# Patient Record
(demographics unavailable — no encounter records)

---

## 2025-05-16 NOTE — HISTORY OF PRESENT ILLNESS
[FreeTextEntry1] : I am here for my annual visit. [de-identified] : The patient is a 74 YO female with a pmhx of HTN, HLD, pre-DM, GERD who presents to the clinic today for her annual visit. She reports feeling well. She was recently in a cruise. She reports having lower extremity swelling after eating meals high in sodium. However, this went away on its own. She has no other concerns today.

## 2025-05-16 NOTE — HEALTH RISK ASSESSMENT
[MammogramComments] : to be scheduled for repeat [Good] : ~his/her~  mood as  good [Monthly or less (1 pt)] : Monthly or less (1 point) [No falls in past year] : Patient reported no falls in the past year [0] : 2) Feeling down, depressed, or hopeless: Not at all (0) [PHQ-2 Negative - No further assessment needed] : PHQ-2 Negative - No further assessment needed [Benzodiazepines] : benzodiazepines [Opioids] : opioids [Blood Thinners] : blood thinners [Muscle Relaxants] : muscle relaxants [Sedatives] : sedatives [Former] : Former [> 15 Years] : > 15 Years [NO] : No [No Retinopathy] : No retinopathy [Patient reported mammogram was normal] : Patient reported mammogram was normal [Patient reported colonoscopy was abnormal] : Patient reported colonoscopy was abnormal [None] : None [Alone] : lives alone [Retired] : retired [] :  [Fully functional (bathing, dressing, toileting, transferring, walking, feeding)] : Fully functional (bathing, dressing, toileting, transferring, walking, feeding) [Fully functional (using the telephone, shopping, preparing meals, housekeeping, doing laundry, using] : Fully functional and needs no help or supervision to perform IADLs (using the telephone, shopping, preparing meals, housekeeping, doing laundry, using transportation, managing medications and managing finances) [With Patient/Caregiver] : , with patient/caregiver [Designated Healthcare Proxy] : Designated healthcare proxy [Name: ___] : Health Care Proxy's Name: [unfilled]  [Relationship: ___] : Relationship: [unfilled] [1 or 2 (0 pts)] : 1 or 2 (0 points) [Never (0 pts)] : Never (0 points) [Yes] : Reviewed medication list for presence of high-risk medications. [HIV test declined] : HIV test declined [Hepatitis C test declined] : Hepatitis C test declined [Smoke Detector] : smoke detector [Seat Belt] :  uses seat belt [Audit-CScore] : 1 [de-identified] : walks [de-identified] : little red meat, mainly fish and chicken. consumes vegetables as well. [EyeExamDate] : 01/24 [Change in mental status noted] : No change in mental status noted [Language] : denies difficulty with language [Behavior] : denies difficulty with behavior [Learning/Retaining New Information] : denies difficulty learning/retaining new information [Handling Complex Tasks] : denies difficulty handling complex tasks [Reasoning] : denies difficulty with reasoning [Spatial Ability and Orientation] : denies difficulty with spatial ability and orientation [Sexually Active] : not sexually active [High Risk Behavior] : no high risk behavior [Reports changes in hearing] : Reports no changes in hearing [Reports changes in vision] : Reports no changes in vision [Reports normal functional visual acuity (ie: able to read med bottle)] : Reports poor functional visual acuity.  [Reports changes in dental health] : Reports no changes in dental health [MammogramDate] : 04/24 [ColonoscopyDate] : 11/24 [ColonoscopyComments] : sigmoid poly, repeat in 5 years [AdvancecareDate] : 05/25

## 2025-05-16 NOTE — HEALTH RISK ASSESSMENT
[MammogramComments] : to be scheduled for repeat [Good] : ~his/her~  mood as  good [Monthly or less (1 pt)] : Monthly or less (1 point) [No falls in past year] : Patient reported no falls in the past year [0] : 2) Feeling down, depressed, or hopeless: Not at all (0) [PHQ-2 Negative - No further assessment needed] : PHQ-2 Negative - No further assessment needed [Benzodiazepines] : benzodiazepines [Opioids] : opioids [Blood Thinners] : blood thinners [Muscle Relaxants] : muscle relaxants [Sedatives] : sedatives [Former] : Former [> 15 Years] : > 15 Years [NO] : No [No Retinopathy] : No retinopathy [Patient reported mammogram was normal] : Patient reported mammogram was normal [Patient reported colonoscopy was abnormal] : Patient reported colonoscopy was abnormal [None] : None [Alone] : lives alone [Retired] : retired [] :  [Fully functional (bathing, dressing, toileting, transferring, walking, feeding)] : Fully functional (bathing, dressing, toileting, transferring, walking, feeding) [Fully functional (using the telephone, shopping, preparing meals, housekeeping, doing laundry, using] : Fully functional and needs no help or supervision to perform IADLs (using the telephone, shopping, preparing meals, housekeeping, doing laundry, using transportation, managing medications and managing finances) [With Patient/Caregiver] : , with patient/caregiver [Designated Healthcare Proxy] : Designated healthcare proxy [Name: ___] : Health Care Proxy's Name: [unfilled]  [Relationship: ___] : Relationship: [unfilled] [1 or 2 (0 pts)] : 1 or 2 (0 points) [Never (0 pts)] : Never (0 points) [Yes] : Reviewed medication list for presence of high-risk medications. [HIV test declined] : HIV test declined [Hepatitis C test declined] : Hepatitis C test declined [Smoke Detector] : smoke detector [Seat Belt] :  uses seat belt [Audit-CScore] : 1 [de-identified] : walks [de-identified] : little red meat, mainly fish and chicken. consumes vegetables as well. [EyeExamDate] : 01/24 [Change in mental status noted] : No change in mental status noted [Language] : denies difficulty with language [Behavior] : denies difficulty with behavior [Learning/Retaining New Information] : denies difficulty learning/retaining new information [Handling Complex Tasks] : denies difficulty handling complex tasks [Reasoning] : denies difficulty with reasoning [Spatial Ability and Orientation] : denies difficulty with spatial ability and orientation [Sexually Active] : not sexually active [High Risk Behavior] : no high risk behavior [Reports changes in hearing] : Reports no changes in hearing [Reports changes in vision] : Reports no changes in vision [Reports normal functional visual acuity (ie: able to read med bottle)] : Reports poor functional visual acuity.  [Reports changes in dental health] : Reports no changes in dental health [MammogramDate] : 04/24 [ColonoscopyDate] : 11/24 [ColonoscopyComments] : sigmoid poly, repeat in 5 years [AdvancecareDate] : 05/25

## 2025-05-16 NOTE — HISTORY OF PRESENT ILLNESS
[FreeTextEntry1] : I am here for my annual visit. [de-identified] : The patient is a 74 YO female with a pmhx of HTN, HLD, pre-DM, GERD who presents to the clinic today for her annual visit. She reports feeling well. She was recently in a cruise. She reports having lower extremity swelling after eating meals high in sodium. However, this went away on its own. She has no other concerns today.

## 2025-05-16 NOTE — ASSESSMENT
[Vaccines Reviewed] : Immunizations reviewed today. Please see immunization details in the vaccine log within the immunization flowsheet.  [FreeTextEntry1] : The patient is a 72 YO female with a pmhx of HTN, HLD, pre-DM, GERD who presents to the clinic today for her annual visit. She reports feeling well. She was recently in a cruise. She reports having lower extremity swelling after eating meals high in sodium. However, this went away on its own. She has no other concerns today.  Annual alcohol screen completed this visit using AUDIT C screening tool and results discussed with patient.  Alcohol use within healthy limits.  Healthy drinking guidelines shared with patient (Female and male overage 65 no more than 3 SSD on any day, no more than 7 per week). Positive reinforcement provided given patient currently within healthy guidelines.   I conducted an annual depression screen using the PHQ 2/9 and discussed results with the patient during this visit.  Screening not suggestive of diagnosis of MDD.    Due for breast Ca and osteoporosis screening - referrals/Rx's provided.   RTC in 6 months  Case Discussed with Dr. German

## 2025-05-16 NOTE — HISTORY OF PRESENT ILLNESS
[FreeTextEntry1] : I am here for my annual visit. [de-identified] : The patient is a 72 YO female with a pmhx of HTN, HLD, pre-DM, GERD who presents to the clinic today for her annual visit. She reports feeling well. She was recently in a cruise. She reports having lower extremity swelling after eating meals high in sodium. However, this went away on its own. She has no other concerns today.

## 2025-05-16 NOTE — ASSESSMENT
[Vaccines Reviewed] : Immunizations reviewed today. Please see immunization details in the vaccine log within the immunization flowsheet.  [FreeTextEntry1] : The patient is a 74 YO female with a pmhx of HTN, HLD, pre-DM, GERD who presents to the clinic today for her annual visit. She reports feeling well. She was recently in a cruise. She reports having lower extremity swelling after eating meals high in sodium. However, this went away on its own. She has no other concerns today.  Annual alcohol screen completed this visit using AUDIT C screening tool and results discussed with patient.  Alcohol use within healthy limits.  Healthy drinking guidelines shared with patient (Female and male overage 65 no more than 3 SSD on any day, no more than 7 per week). Positive reinforcement provided given patient currently within healthy guidelines.   I conducted an annual depression screen using the PHQ 2/9 and discussed results with the patient during this visit.  Screening not suggestive of diagnosis of MDD.    Due for breast Ca and osteoporosis screening - referrals/Rx's provided.   RTC in 6 months  Case Discussed with Dr. German

## 2025-05-16 NOTE — HEALTH RISK ASSESSMENT
[MammogramComments] : to be scheduled for repeat [Good] : ~his/her~  mood as  good [Monthly or less (1 pt)] : Monthly or less (1 point) [No falls in past year] : Patient reported no falls in the past year [0] : 2) Feeling down, depressed, or hopeless: Not at all (0) [PHQ-2 Negative - No further assessment needed] : PHQ-2 Negative - No further assessment needed [Benzodiazepines] : benzodiazepines [Opioids] : opioids [Blood Thinners] : blood thinners [Muscle Relaxants] : muscle relaxants [Sedatives] : sedatives [Former] : Former [> 15 Years] : > 15 Years [NO] : No [No Retinopathy] : No retinopathy [Patient reported mammogram was normal] : Patient reported mammogram was normal [Patient reported colonoscopy was abnormal] : Patient reported colonoscopy was abnormal [None] : None [Alone] : lives alone [Retired] : retired [] :  [Fully functional (bathing, dressing, toileting, transferring, walking, feeding)] : Fully functional (bathing, dressing, toileting, transferring, walking, feeding) [Fully functional (using the telephone, shopping, preparing meals, housekeeping, doing laundry, using] : Fully functional and needs no help or supervision to perform IADLs (using the telephone, shopping, preparing meals, housekeeping, doing laundry, using transportation, managing medications and managing finances) [With Patient/Caregiver] : , with patient/caregiver [Designated Healthcare Proxy] : Designated healthcare proxy [Name: ___] : Health Care Proxy's Name: [unfilled]  [Relationship: ___] : Relationship: [unfilled] [1 or 2 (0 pts)] : 1 or 2 (0 points) [Never (0 pts)] : Never (0 points) [Yes] : Reviewed medication list for presence of high-risk medications. [HIV test declined] : HIV test declined [Hepatitis C test declined] : Hepatitis C test declined [Smoke Detector] : smoke detector [Seat Belt] :  uses seat belt [Audit-CScore] : 1 [de-identified] : walks [de-identified] : little red meat, mainly fish and chicken. consumes vegetables as well. [EyeExamDate] : 01/24 [Change in mental status noted] : No change in mental status noted [Language] : denies difficulty with language [Behavior] : denies difficulty with behavior [Learning/Retaining New Information] : denies difficulty learning/retaining new information [Handling Complex Tasks] : denies difficulty handling complex tasks [Reasoning] : denies difficulty with reasoning [Spatial Ability and Orientation] : denies difficulty with spatial ability and orientation [Sexually Active] : not sexually active [High Risk Behavior] : no high risk behavior [Reports changes in hearing] : Reports no changes in hearing [Reports changes in vision] : Reports no changes in vision [Reports normal functional visual acuity (ie: able to read med bottle)] : Reports poor functional visual acuity.  [Reports changes in dental health] : Reports no changes in dental health [MammogramDate] : 04/24 [ColonoscopyDate] : 11/24 [ColonoscopyComments] : sigmoid poly, repeat in 5 years [AdvancecareDate] : 05/25

## 2025-05-16 NOTE — HEALTH RISK ASSESSMENT
[MammogramComments] : to be scheduled for repeat [Good] : ~his/her~  mood as  good [Monthly or less (1 pt)] : Monthly or less (1 point) [No falls in past year] : Patient reported no falls in the past year [0] : 2) Feeling down, depressed, or hopeless: Not at all (0) [PHQ-2 Negative - No further assessment needed] : PHQ-2 Negative - No further assessment needed [Benzodiazepines] : benzodiazepines [Opioids] : opioids [Blood Thinners] : blood thinners [Muscle Relaxants] : muscle relaxants [Sedatives] : sedatives [Former] : Former [> 15 Years] : > 15 Years [NO] : No [No Retinopathy] : No retinopathy [Patient reported mammogram was normal] : Patient reported mammogram was normal [Patient reported colonoscopy was abnormal] : Patient reported colonoscopy was abnormal [None] : None [Alone] : lives alone [Retired] : retired [] :  [Fully functional (bathing, dressing, toileting, transferring, walking, feeding)] : Fully functional (bathing, dressing, toileting, transferring, walking, feeding) [Fully functional (using the telephone, shopping, preparing meals, housekeeping, doing laundry, using] : Fully functional and needs no help or supervision to perform IADLs (using the telephone, shopping, preparing meals, housekeeping, doing laundry, using transportation, managing medications and managing finances) [With Patient/Caregiver] : , with patient/caregiver [Designated Healthcare Proxy] : Designated healthcare proxy [Name: ___] : Health Care Proxy's Name: [unfilled]  [Relationship: ___] : Relationship: [unfilled] [1 or 2 (0 pts)] : 1 or 2 (0 points) [Never (0 pts)] : Never (0 points) [Yes] : Reviewed medication list for presence of high-risk medications. [HIV test declined] : HIV test declined [Hepatitis C test declined] : Hepatitis C test declined [Smoke Detector] : smoke detector [Seat Belt] :  uses seat belt [Audit-CScore] : 1 [de-identified] : walks [de-identified] : little red meat, mainly fish and chicken. consumes vegetables as well. [EyeExamDate] : 01/24 [Change in mental status noted] : No change in mental status noted [Language] : denies difficulty with language [Behavior] : denies difficulty with behavior [Learning/Retaining New Information] : denies difficulty learning/retaining new information [Handling Complex Tasks] : denies difficulty handling complex tasks [Reasoning] : denies difficulty with reasoning [Spatial Ability and Orientation] : denies difficulty with spatial ability and orientation [Sexually Active] : not sexually active [High Risk Behavior] : no high risk behavior [Reports changes in hearing] : Reports no changes in hearing [Reports changes in vision] : Reports no changes in vision [Reports normal functional visual acuity (ie: able to read med bottle)] : Reports poor functional visual acuity.  [Reports changes in dental health] : Reports no changes in dental health [MammogramDate] : 04/24 [ColonoscopyDate] : 11/24 [ColonoscopyComments] : sigmoid poly, repeat in 5 years [AdvancecareDate] : 05/25